# Patient Record
Sex: FEMALE | Race: OTHER | Employment: OTHER | URBAN - METROPOLITAN AREA
[De-identification: names, ages, dates, MRNs, and addresses within clinical notes are randomized per-mention and may not be internally consistent; named-entity substitution may affect disease eponyms.]

---

## 2018-05-25 NOTE — PATIENT DISCUSSION
Surgery  Counseling: I have discussed the option of glasses versus cataract surgery versus following. It was explained that when vision no longer meets the patient's visual needs and a new prescription for glasses is not likely to improve the patient's visual symptoms, the option of cataract surgery is a reasonable next step. It was explained that there is no guarantee that removing the cataract will improve their visual symptoms, however; it is believed that the cataract is contributing to the patient's visual impairment and surgery may significantly improve both the visual and functional status of the patient. The risks, benefits and alternatives of surgery were discussed with the patient. After this discussion, the patient desires to proceed with cataract surgery with implantation of an intraocular lens to improve vision for reading signs and to reduce glare.

## 2018-05-25 NOTE — PATIENT DISCUSSION
"DISCUSSED MF AND EDOF LENSES AND ALL RBAS INCLUDING HALOING AROUND LIGHTS WITH THOSE TYPES OF LENSES. PT UNDERSTANDS ALL SIDE EFFECTS AND DESIRES TO PROCEED WITHMF IN BOTH EYES AND WEAR READERS AS NEEDED. 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  DISCUSSED DIFFERENT ""SWEET SPOTS"" OF EDOF-VS-MF. SHE UNDERSTANDS AND WOULD LIKE MF"

## 2018-05-25 NOTE — PATIENT DISCUSSION
CATARACT, OU - EQUALLY VISUALLY SIGNIFICANT. SCHEDULE SX OS THEN LATER IN OD IF VISUAL SYMPTOMS PERSIST.  GLS RX GIVEN TO FILL IF DESIRES IN THE EVENT PT DOES NOT PROCEED W/ SX.

## 2018-05-25 NOTE — PATIENT DISCUSSION
***The patient is interested in refractive cataract surgery. After discussing all options for becoming less dependent on glasses after surgery, the patient has elected near and distance vision with multifocal IOLs . The anticipated visual outcome is satisfactory distance and near (primarily for printed material- the patient may require glasses for some intermediate vision). ***

## 2018-05-25 NOTE — PATIENT DISCUSSION
REFRACTIVE ERROR, OU - DISCUSSED OPTION OF CORRECTING AT THE TIME OF CATARACT SURGERY. PT UNDERSTANDS AND ELECTS TO CONSIDER HER OPTIONS. ADV BEST QUALITY OF VA WILL BE DVA AND TO WEAR READERS. ADV EVEN THOUGH SHE REPORTS NOT NEEDING READERS AT ALL TIMES NOW, SHE WILL ABSOLUTELY BE DEPENDENT ON READERS FOR ALL NVA/INTERMEDIATE VA WHEN BOTH EYES ARE SET FOR DVA.

## 2018-06-20 NOTE — PATIENT DISCUSSION
BURP PERFORMED IN THE OFFICE TODAY TO RELIEVE ELEVATED IOP. INSTILLED 1 DROP OPHTHETIC AND 1 DROP ANTIBIOTIC PRIOR TO BURP. IOP S/P BURP ACCEPTABLE.

## 2018-06-20 NOTE — PATIENT DISCUSSION
***This patient had  refractive cataract surgery performed. A  multifocal IOL was placed to achieve a target refraction of plano (which should provide them with satisfactory distance and near vision). ***

## 2018-06-25 NOTE — PATIENT DISCUSSION
Surgery  Counseling: I have discussed the option of glasses versus cataract surgery versus following . It was explained that when vision no longer meets the patient's visual needs and a new prescription for glasses is not likely to improve all of the patient's visual symptoms, the option of cataract surgery is a reasonable next step. It was explained that there is no guarantee that removing the cataract will improve their visual symptoms, however; it is believed that the cataract is contributing to the patient's visual impairment and surgery may significantly improve both the visual and functional status of the patient. The risks, benefits and alternatives of surgery were discussed with the patient. After this discussion, the patient desires to proceed with cataract surgery with implantation of an intraocular lens to improve vision for distance, reading and glare.

## 2018-06-25 NOTE — PATIENT DISCUSSION
S/P PCIOL, OS- STABLE, DOING WELL. OK TO PROCEED WITH FELLOW EYE SURGERY. PT REPORTS THAT THEY ARE HAPPY WITH THE OUTCOME OF THE OPERATIVE EYE AND READY TO PURSUE SX IN THE FELLOW EYE.

## 2018-06-27 NOTE — PATIENT DISCUSSION
***This patient had refractive cataract surgery performed. A multifocal  IOL was placed to achieve a target refraction of _plano (which should provide them with satisfactory distance and near vision). ***

## 2021-12-13 NOTE — PATIENT DISCUSSION
In the event the patient declines a refractive package, traditional cataract surgery will be performed and a monofocal IOL will be implanted to target distance vision (or plano). Yes

## 2022-06-20 RX ORDER — ROSUVASTATIN CALCIUM 5 MG/1
TABLET, COATED ORAL
COMMUNITY
Start: 2021-12-10

## 2022-06-20 RX ORDER — METFORMIN HYDROCHLORIDE 500 MG/1
TABLET, EXTENDED RELEASE ORAL
COMMUNITY
Start: 2022-03-14

## 2022-07-18 ENCOUNTER — NEW PATIENT (OUTPATIENT)
Dept: URBAN - METROPOLITAN AREA CLINIC 7 | Facility: CLINIC | Age: 57
End: 2022-07-18

## 2022-07-18 DIAGNOSIS — H52.12: ICD-10-CM

## 2022-07-18 DIAGNOSIS — H25.13: ICD-10-CM

## 2022-07-18 DIAGNOSIS — H52.01: ICD-10-CM

## 2022-07-18 PROCEDURE — 92015 DETERMINE REFRACTIVE STATE: CPT

## 2022-07-18 PROCEDURE — 92004 COMPRE OPH EXAM NEW PT 1/>: CPT

## 2022-07-18 ASSESSMENT — KERATOMETRY
OD_AXISANGLE2_DEGREES: 61
OD_K1POWER_DIOPTERS: 46.50
OS_K2POWER_DIOPTERS: 47.25
OS_AXISANGLE2_DEGREES: 129
OS_K1POWER_DIOPTERS: 45.75
OD_AXISANGLE_DEGREES: 151
OS_AXISANGLE_DEGREES: 39
OD_K2POWER_DIOPTERS: 46.75

## 2022-07-18 ASSESSMENT — VISUAL ACUITY
OD_CC: 20/40+2
OU_CC: 20/20-1
OS_CC: 20/20

## 2022-07-18 ASSESSMENT — TONOMETRY
OD_IOP_MMHG: 18
OS_IOP_MMHG: 18